# Patient Record
Sex: FEMALE | Race: WHITE | NOT HISPANIC OR LATINO | Employment: UNEMPLOYED | URBAN - METROPOLITAN AREA
[De-identification: names, ages, dates, MRNs, and addresses within clinical notes are randomized per-mention and may not be internally consistent; named-entity substitution may affect disease eponyms.]

---

## 2017-10-25 ENCOUNTER — HOSPITAL ENCOUNTER (OUTPATIENT)
Dept: LAB | Facility: MEDICAL CENTER | Age: 47
End: 2017-10-25
Attending: FAMILY MEDICINE
Payer: MEDICAID

## 2017-10-25 LAB
ALBUMIN SERPL BCP-MCNC: 4.5 G/DL (ref 3.2–4.9)
ALBUMIN/GLOB SERPL: 1.5 G/DL
ALP SERPL-CCNC: 67 U/L (ref 30–99)
ALT SERPL-CCNC: 14 U/L (ref 2–50)
ANION GAP SERPL CALC-SCNC: 5 MMOL/L (ref 0–11.9)
AST SERPL-CCNC: 18 U/L (ref 12–45)
BASOPHILS # BLD AUTO: 0.6 % (ref 0–1.8)
BASOPHILS # BLD: 0.03 K/UL (ref 0–0.12)
BILIRUB SERPL-MCNC: 0.4 MG/DL (ref 0.1–1.5)
BUN SERPL-MCNC: 10 MG/DL (ref 8–22)
CALCIUM SERPL-MCNC: 9.6 MG/DL (ref 8.5–10.5)
CHLORIDE SERPL-SCNC: 105 MMOL/L (ref 96–112)
CHOLEST SERPL-MCNC: 288 MG/DL (ref 100–199)
CO2 SERPL-SCNC: 28 MMOL/L (ref 20–33)
CREAT SERPL-MCNC: 0.71 MG/DL (ref 0.5–1.4)
EOSINOPHIL # BLD AUTO: 0.13 K/UL (ref 0–0.51)
EOSINOPHIL NFR BLD: 2.8 % (ref 0–6.9)
ERYTHROCYTE [DISTWIDTH] IN BLOOD BY AUTOMATED COUNT: 41.2 FL (ref 35.9–50)
EST. AVERAGE GLUCOSE BLD GHB EST-MCNC: 114 MG/DL
GFR SERPL CREATININE-BSD FRML MDRD: >60 ML/MIN/1.73 M 2
GLOBULIN SER CALC-MCNC: 3.1 G/DL (ref 1.9–3.5)
GLUCOSE SERPL-MCNC: 94 MG/DL (ref 65–99)
HBA1C MFR BLD: 5.6 % (ref 0–5.6)
HCT VFR BLD AUTO: 44.7 % (ref 37–47)
HDLC SERPL-MCNC: 68 MG/DL
HGB BLD-MCNC: 15.7 G/DL (ref 12–16)
IMM GRANULOCYTES # BLD AUTO: 0.01 K/UL (ref 0–0.11)
IMM GRANULOCYTES NFR BLD AUTO: 0.2 % (ref 0–0.9)
LDLC SERPL CALC-MCNC: 199 MG/DL
LYMPHOCYTES # BLD AUTO: 1.81 K/UL (ref 1–4.8)
LYMPHOCYTES NFR BLD: 39.1 % (ref 22–41)
MCH RBC QN AUTO: 31.4 PG (ref 27–33)
MCHC RBC AUTO-ENTMCNC: 35.1 G/DL (ref 33.6–35)
MCV RBC AUTO: 89.4 FL (ref 81.4–97.8)
MONOCYTES # BLD AUTO: 0.27 K/UL (ref 0–0.85)
MONOCYTES NFR BLD AUTO: 5.8 % (ref 0–13.4)
NEUTROPHILS # BLD AUTO: 2.38 K/UL (ref 2–7.15)
NEUTROPHILS NFR BLD: 51.5 % (ref 44–72)
NRBC # BLD AUTO: 0 K/UL
NRBC BLD AUTO-RTO: 0 /100 WBC
PLATELET # BLD AUTO: 210 K/UL (ref 164–446)
PMV BLD AUTO: 10.8 FL (ref 9–12.9)
POTASSIUM SERPL-SCNC: 4.4 MMOL/L (ref 3.6–5.5)
PROT SERPL-MCNC: 7.6 G/DL (ref 6–8.2)
RBC # BLD AUTO: 5 M/UL (ref 4.2–5.4)
SODIUM SERPL-SCNC: 138 MMOL/L (ref 135–145)
TRIGL SERPL-MCNC: 107 MG/DL (ref 0–149)
TSH SERPL DL<=0.005 MIU/L-ACNC: 2.8 UIU/ML (ref 0.3–3.7)
WBC # BLD AUTO: 4.6 K/UL (ref 4.8–10.8)

## 2017-10-25 PROCEDURE — 80061 LIPID PANEL: CPT

## 2017-10-25 PROCEDURE — 80053 COMPREHEN METABOLIC PANEL: CPT

## 2017-10-25 PROCEDURE — 36415 COLL VENOUS BLD VENIPUNCTURE: CPT

## 2017-10-25 PROCEDURE — 84443 ASSAY THYROID STIM HORMONE: CPT

## 2017-10-25 PROCEDURE — 85025 COMPLETE CBC W/AUTO DIFF WBC: CPT

## 2017-10-25 PROCEDURE — 83036 HEMOGLOBIN GLYCOSYLATED A1C: CPT

## 2017-12-12 ENCOUNTER — OFFICE VISIT (OUTPATIENT)
Dept: MEDICAL GROUP | Facility: MEDICAL CENTER | Age: 47
End: 2017-12-12
Attending: INTERNAL MEDICINE
Payer: MEDICAID

## 2017-12-12 VITALS
TEMPERATURE: 97.9 F | DIASTOLIC BLOOD PRESSURE: 78 MMHG | HEIGHT: 67 IN | RESPIRATION RATE: 16 BRPM | SYSTOLIC BLOOD PRESSURE: 120 MMHG | WEIGHT: 177 LBS | HEART RATE: 108 BPM | OXYGEN SATURATION: 97 % | BODY MASS INDEX: 27.78 KG/M2

## 2017-12-12 DIAGNOSIS — F31.70 BIPOLAR DISORDER IN FULL REMISSION, MOST RECENT EPISODE UNSPECIFIED TYPE (HCC): ICD-10-CM

## 2017-12-12 DIAGNOSIS — M25.562 CHRONIC PAIN OF LEFT KNEE: ICD-10-CM

## 2017-12-12 DIAGNOSIS — G43.009 MIGRAINE WITHOUT AURA AND WITHOUT STATUS MIGRAINOSUS, NOT INTRACTABLE: ICD-10-CM

## 2017-12-12 DIAGNOSIS — K21.9 GASTROESOPHAGEAL REFLUX DISEASE, ESOPHAGITIS PRESENCE NOT SPECIFIED: ICD-10-CM

## 2017-12-12 DIAGNOSIS — K51.90 ULCERATIVE COLITIS WITHOUT COMPLICATIONS, UNSPECIFIED LOCATION (HCC): ICD-10-CM

## 2017-12-12 DIAGNOSIS — G35 MULTIPLE SCLEROSIS (HCC): ICD-10-CM

## 2017-12-12 DIAGNOSIS — G89.29 CHRONIC PAIN OF LEFT KNEE: ICD-10-CM

## 2017-12-12 PROBLEM — J30.2 SEASONAL ALLERGIES: Status: ACTIVE | Noted: 2017-12-12

## 2017-12-12 PROCEDURE — 99204 OFFICE O/P NEW MOD 45 MIN: CPT | Performed by: INTERNAL MEDICINE

## 2017-12-12 RX ORDER — CLONAZEPAM 1 MG/1
TABLET ORAL
Refills: 3 | COMMUNITY
Start: 2017-11-03

## 2017-12-12 RX ORDER — FEXOFENADINE HCL 180 MG/1
180 TABLET ORAL DAILY
Qty: 30 TAB | COMMUNITY
Start: 2017-12-12

## 2017-12-12 RX ORDER — CHOLECALCIFEROL (VITAMIN D3) 125 MCG
500 CAPSULE ORAL DAILY
Qty: 30 TAB | Refills: 3 | COMMUNITY
Start: 2017-12-12

## 2017-12-12 RX ORDER — DOCUSATE SODIUM 100 MG/1
300 CAPSULE, LIQUID FILLED ORAL
COMMUNITY
Start: 2017-12-12

## 2017-12-12 RX ORDER — SUMATRIPTAN 50 MG/1
50 TABLET, FILM COATED ORAL
Qty: 10 TAB | Refills: 1 | Status: SHIPPED | OUTPATIENT
Start: 2017-12-12 | End: 2018-04-06 | Stop reason: SDUPTHER

## 2017-12-12 RX ORDER — LAMOTRIGINE 25 MG/1
TABLET ORAL
Refills: 3 | COMMUNITY
Start: 2017-11-27 | End: 2018-01-24

## 2017-12-12 RX ORDER — CARBAMAZEPINE 200 MG/1
400 TABLET ORAL
Refills: 3 | COMMUNITY
Start: 2017-11-27

## 2017-12-12 RX ORDER — DIMETHYL FUMARATE 240 MG/1
1 CAPSULE ORAL 2 TIMES DAILY
Qty: 60 CAP | Status: SHIPPED | DISCHARGE
Start: 2017-12-12

## 2017-12-12 RX ORDER — METHYLPREDNISOLONE 4 MG
1 TABLET, DOSE PACK ORAL DAILY
COMMUNITY
Start: 2017-12-12

## 2017-12-12 RX ORDER — SUMATRIPTAN 50 MG/1
TABLET, FILM COATED ORAL
Refills: 0 | COMMUNITY
Start: 2017-10-13 | End: 2017-12-12 | Stop reason: SDUPTHER

## 2017-12-12 RX ORDER — QUETIAPINE FUMARATE 50 MG/1
TABLET, FILM COATED ORAL
Refills: 3 | COMMUNITY
Start: 2017-11-27

## 2017-12-12 RX ORDER — OMEPRAZOLE 20 MG/1
20 TABLET, DELAYED RELEASE ORAL DAILY
Qty: 30 TAB | COMMUNITY
Start: 2017-12-12

## 2017-12-12 ASSESSMENT — PAIN SCALES - GENERAL: PAINLEVEL: NO PAIN

## 2017-12-12 ASSESSMENT — PATIENT HEALTH QUESTIONNAIRE - PHQ9: CLINICAL INTERPRETATION OF PHQ2 SCORE: 0

## 2017-12-12 NOTE — ASSESSMENT & PLAN NOTE
Patient reports being diagnosed with multiple sclerosis several years ago. She currently follows with Dr. Rutherford of neurology. She is on tecfidera 240 mg twice a day.  She is not sure how well her MS is controlled. She doesn't have a good understanding of what constitutes a flare. She states that her current neurologist has told her she is not a MS expert and cannot really help with this. The patient would really like to see someone who is an MS specialist. She complains of muscle spasms in her legs, sharp pain that she feels from her eyes to the back for neck with itching of her ear when it happens, numbness of the top of her scalp. She has tried numerous muscle relaxers including baclofen, Robaxin, Flexeril and states that none of these have been helpful. In the past she smoked marijuana recreationally and did note that it helped a lot with her MS symptoms. She has been thinking about doing this again but has not gotten a medical card or bend down to the dispensaries yet.

## 2017-12-12 NOTE — ASSESSMENT & PLAN NOTE
Patient reports being diagnosed with ulcerative colitis at age 30 in [place]. She states the initial diagnosis was made after she was having a lot of blood in her bowel movements and she had a confirmatory sigmoidoscopy followed by colonoscopy. States she has had at least 10 colonoscopies in her life. She thinks was about 3 years ago when she was living in [place]. She complains of intermittent episodes of hematochezia and abdominal pain usually associated with increased stress. She has not been able to establish care with a gastroenterologist since moving to [place] and feels like she has to much to handle right now with her multiple sclerosis to do this.

## 2017-12-12 NOTE — ASSESSMENT & PLAN NOTE
The patient currently follows with Dr. Fatima of psychiatry. She takes Seroquel, Lamictal, Klonopin, Fetzima, and Tegretol. She feels that her symptoms are well controlled at this point.

## 2017-12-13 PROBLEM — K21.9 GERD (GASTROESOPHAGEAL REFLUX DISEASE): Status: ACTIVE | Noted: 2017-12-13

## 2017-12-13 PROBLEM — G43.009 MIGRAINE WITHOUT AURA AND WITHOUT STATUS MIGRAINOSUS, NOT INTRACTABLE: Status: ACTIVE | Noted: 2017-12-13

## 2017-12-13 NOTE — PROGRESS NOTES
Freda Linares is a 47 y.o. female here for chronic medical problems as discussed below, muscle spasms and chronic pain, establish care  HPI:  Previous PCP through [facility]    Multiple sclerosis (CMS-HCC)  Patient reports being diagnosed with multiple sclerosis several years ago. She currently follows with Dr. Rutherford of neurology. She is on tecfidera 240 mg twice a day.  She is not sure how well her MS is controlled. She doesn't have a good understanding of what constitutes a flare. She states that her current neurologist has told her she is not a MS expert and cannot really help with this. The patient would really like to see someone who is an MS specialist. She complains of muscle spasms in her legs, sharp pain that she feels from her eyes to the back for neck with itching of her ear when it happens, numbness of the top of her scalp. She has tried numerous muscle relaxers including baclofen, Robaxin, Flexeril and states that none of these have been helpful. In the past she smoked marijuana recreationally and did note that it helped a lot with her MS symptoms. She has been thinking about doing this again but has not gotten a medical card or bend down to the dispensaries yet.     Ulcerative colitis (CMS-HCC)  Patient reports being diagnosed with ulcerative colitis at age 30 in [place]. She states the initial diagnosis was made after she was having a lot of blood in her bowel movements and she had a confirmatory sigmoidoscopy followed by colonoscopy. States she has had at least 10 colonoscopies in her life. She thinks was about 3 years ago when she was living in [place]. She complains of intermittent episodes of hematochezia and abdominal pain usually associated with increased stress. She has not been able to establish care with a gastroenterologist since moving to [place] and feels like she has to much to handle right now with her multiple sclerosis to do this.     Bipolar disorder in full remission (CMS-HCC)  The patient  currently follows with Dr. Fatima of psychiatry. She takes Seroquel, Lamictal, Klonopin, Fetzima, and Tegretol. She feels that her symptoms are well controlled at this point.    Chronic pain of left knee  Patient reports daily pain in the posterior aspect of her left knee. She reports a sensation of fullness, pain with flexion and extension of the knee that is severe. She has never had any imaging done of the knee.    GERD (gastroesophageal reflux disease)  Takes daily omeprazole which states controls her symptoms well.    Migraine without aura and without status migrainosus, not intractable  Uses sumatriptan as needed which works well for her. She is requesting a refill today.    Current medicines (including changes today)  Current Outpatient Prescriptions   Medication Sig Dispense Refill   • Levomilnacipran HCl ER (FETZIMA) 40 MG CAPSULE SR 24 HR Take 1 Cap by mouth every day.     • Dimethyl Fumarate (TECFIDERA) 240 MG CAPSULE DELAYED RELEASE Take 240 mg by mouth 2 Times a Day. 60 Cap    • Omega-3 Fatty Acids (RA FISH OIL) 1400 MG CAPSULE DELAYED RELEASE Take 1 Cap by mouth every bedtime.     • Multiple Vitamins-Minerals (BODY/HAIR/SKIN/NAILS) Cap Take 1 Cap by mouth every day.     • Cholecalciferol (VITAMIN D3) 5000 units Cap Take 1 Cap by mouth every day. 30 Cap    • fexofenadine (ALLEGRA) 180 MG tablet Take 1 Tab by mouth every day. 30 Tab    • cyanocobalamin (VITAMIN B-12) 500 MCG Tab Take 1 Tab by mouth every day. 30 Tab 3   • docusate sodium (COLACE) 100 MG Cap Take 3 Caps by mouth every bedtime.     • omeprazole (PRILOSEC OTC) 20 MG tablet Take 1 Tab by mouth every day. 30 Tab    • sumatriptan (IMITREX) 50 MG Tab Take 1 Tab by mouth Once PRN for Migraine for up to 1 dose. 10 Tab 1   • quetiapine (SEROQUEL) 50 MG tablet TK 1 T PO QHS  3   • carbamazepine (TEGRETOL) 200 MG Tab Take 400 mg by mouth every bedtime.  3   • lamotrigine (LAMICTAL) 25 MG Tab TK 2 TS PO QHS  3   • clonazepam (KLONOPIN) 1 MG Tab TK 1 T  "PO BID  3     No current facility-administered medications for this visit.      She  has a past medical history of Bipolar 1 disorder (CMS-[facility]); DJD (degenerative joint disease); and Multiple sclerosis (CMS-[facility]).  She  has a past surgical history that includes cholecystectomy; thyroid lobectomy; primary c section; abdominal hysterectomy total; and carpal tunnel release.  Social History   Substance Use Topics   • Smoking status: Former Smoker     Packs/day: 0.50     Years: 31.00     Types: Cigarettes     Quit date: 9/12/2017   • Smokeless tobacco: Never Used   • Alcohol use No     Social History     Social History Narrative   • No narrative on file     Family History   Problem Relation Age of Onset   • Asthma Mother    • Allergies Mother    • Hypertension Mother    • Hypertension Maternal Grandmother    • Cancer Maternal Grandfather    • Diabetes Neg Hx    • Heart Disease Neg Hx    • Stroke Neg Hx    • Psychiatry Neg Hx    • Alcohol/Drug Neg Hx          ROS  As above in HPI  All other systems reviewed and are negative     Objective:     Blood pressure 120/78, pulse (!) 108, temperature 36.6 °C (97.9 °F), resp. rate 16, height 1.702 m (5' 7\"), weight 80.3 kg (177 lb), SpO2 97 %, not currently breastfeeding. Body mass index is 27.72 kg/m².  Physical Exam:    Constitutional: Alert, no distress.  Skin: Warm, dry, good turgor, no rashes in visible areas.  Eye: Equal, round and reactive, conjunctiva clear, lids normal.  ENMT: Lips without lesions, good dentition, oropharynx clear, TM's clear bilaterally.  Neck: Trachea midline, no masses, no thyromegaly. No cervical or supraclavicular lymphadenopathy.  Respiratory: Unlabored respiratory effort, lungs clear to auscultation, no wheezes, no ronchi.  Cardiovascular: Normal S1, S2, no murmur, no edema.  Abdomen: Soft, non-tender, no masses, no hepatosplenomegaly.  Psych: Alert and oriented x3, normal affect and mood.  MSK: fullness in posterior fossa of left knee with " tenderness to palpaiton  Neuro: no clonus, cogwheeling, rigidity, muscle spasms elicited on exam.  CN II-XII grossly intact      Assessment and Plan:   The following treatment plan was discussed    1. Multiple sclerosis (CMS-HCC)  Unclear degree of control. Patient continues on Tedfidera . She would like to see an MS specialist. With her insurance, she can be seen by renowned neurology and I have sent a message to Dr. Bloch requesting for the patient to see her one of her recommendations.  She has agreed to see the patient.  - Dimethyl Fumarate (TECFIDERA) 240 MG CAPSULE DELAYED RELEASE; Take 240 mg by mouth 2 Times a Day.  Dispense: 60 Cap  - REFERRAL TO NEUROLOGY    2. Bipolar disorder in full remission, most recent episode unspecified type (CMS-HCC)  Stable, well controlled with current medications which patient receives from psychiatry. Continue follow-up with psychiatry.  - quetiapine (SEROQUEL) 50 MG tablet; TK 1 T PO QHS; Refill: 3  - carbamazepine (TEGRETOL) 200 MG Tab; Take 400 mg by mouth every bedtime.; Refill: 3  - lamotrigine (LAMICTAL) 25 MG Tab; TK 2 TS PO QHS; Refill: 3  - clonazepam (KLONOPIN) 1 MG Tab; TK 1 T PO BID; Refill: 3  - Levomilnacipran HCl ER (FETZIMA) 40 MG CAPSULE SR 24 HR; Take 1 Cap by mouth every day.    3. Ulcerative colitis without complications, unspecified location (CMS-HCC)  Unclear degree of control. We do not have any documentation supporting this is a definitive diagnosis but the patient does report a history consistent with it. She does not want to see a GI specialist at this point as she feels like she has to much to handle with her MS currently. We will defer GI referral until her MS has been sorted out a bit better.  -GI referral in the future, postponed for now at patient request    4. Chronic pain of left knee  Suspect Baker's cyst. We will get an ultrasound of the posterior knee to look for this. If negative, we'll proceed with an x-ray to look for any osteoarthritis.  Physical therapy is an option in the future.  - US-EXTREMITY NON VASCULAR BILATERAL; Future    5. Gastroesophageal reflux disease, esophagitis presence not specified  Stable, well controlled with current medications which patient purchases over-the-counter  - omeprazole (PRILOSEC OTC) 20 MG tablet; Take 1 Tab by mouth every day.  Dispense: 30 Tab    6. Migraine without aura and without status migrainosus, not intractable  Stable, controlled with sumatriptan. Refilled today.  - sumatriptan (IMITREX) 50 MG Tab; Take 1 Tab by mouth Once PRN for Migraine for up to 1 dose.  Dispense: 10 Tab; Refill: 1        Followup: Return in about 6 weeks (around 1/23/2018), or if symptoms worsen or fail to improve.

## 2017-12-13 NOTE — ASSESSMENT & PLAN NOTE
Patient reports daily pain in the posterior aspect of her left knee. She reports a sensation of fullness, pain with flexion and extension of the knee that is severe. She has never had any imaging done of the knee.

## 2017-12-22 ENCOUNTER — TELEPHONE (OUTPATIENT)
Dept: MEDICAL GROUP | Facility: MEDICAL CENTER | Age: 47
End: 2017-12-22

## 2017-12-22 NOTE — TELEPHONE ENCOUNTER
Please inform patient and referrals department that I have personally spoken with Dr. Melissa Bloch with neurology who is an MS specialist who has agreed to see the patient in her clinic.    Carol Roque M.D.

## 2017-12-22 NOTE — TELEPHONE ENCOUNTER
Received phone call from referrals dept informing us that all Dr Braxton's office has turned down the referral as well as a secondary office. Both offices suggested that the Pt continue to follow up with Dr Roque or perhaps there may be a Facility in [place] that would be willing to accept the Pt. Referrals Dept advised to resubmit the referral if [place] is an avenue that needs to be followed up with.

## 2018-01-16 ENCOUNTER — HOSPITAL ENCOUNTER (OUTPATIENT)
Dept: RADIOLOGY | Facility: MEDICAL CENTER | Age: 48
End: 2018-01-16
Attending: INTERNAL MEDICINE
Payer: MEDICAID

## 2018-01-16 DIAGNOSIS — G89.29 CHRONIC PAIN OF LEFT KNEE: ICD-10-CM

## 2018-01-16 DIAGNOSIS — M25.562 CHRONIC PAIN OF LEFT KNEE: ICD-10-CM

## 2018-01-16 PROCEDURE — 76881 US COMPL JOINT R-T W/IMG: CPT

## 2018-01-24 ENCOUNTER — OFFICE VISIT (OUTPATIENT)
Dept: MEDICAL GROUP | Facility: MEDICAL CENTER | Age: 48
End: 2018-01-24
Attending: INTERNAL MEDICINE
Payer: MEDICAID

## 2018-01-24 ENCOUNTER — HOSPITAL ENCOUNTER (OUTPATIENT)
Dept: RADIOLOGY | Facility: MEDICAL CENTER | Age: 48
End: 2018-01-24
Attending: INTERNAL MEDICINE
Payer: MEDICAID

## 2018-01-24 VITALS
DIASTOLIC BLOOD PRESSURE: 78 MMHG | SYSTOLIC BLOOD PRESSURE: 112 MMHG | TEMPERATURE: 97.9 F | RESPIRATION RATE: 16 BRPM | WEIGHT: 169 LBS | HEART RATE: 94 BPM | OXYGEN SATURATION: 98 % | HEIGHT: 67 IN | BODY MASS INDEX: 26.53 KG/M2

## 2018-01-24 DIAGNOSIS — Z23 NEED FOR VACCINATION: ICD-10-CM

## 2018-01-24 DIAGNOSIS — J45.20 MILD INTERMITTENT ASTHMA WITHOUT COMPLICATION: ICD-10-CM

## 2018-01-24 DIAGNOSIS — M25.562 CHRONIC PAIN OF LEFT KNEE: ICD-10-CM

## 2018-01-24 DIAGNOSIS — R21 RASH: ICD-10-CM

## 2018-01-24 DIAGNOSIS — K51.90 ULCERATIVE COLITIS WITHOUT COMPLICATIONS, UNSPECIFIED LOCATION (HCC): ICD-10-CM

## 2018-01-24 DIAGNOSIS — G89.29 CHRONIC PAIN OF LEFT KNEE: ICD-10-CM

## 2018-01-24 DIAGNOSIS — K59.09 OTHER CONSTIPATION: ICD-10-CM

## 2018-01-24 PROBLEM — K59.00 CONSTIPATION: Status: ACTIVE | Noted: 2018-01-24

## 2018-01-24 PROCEDURE — 99213 OFFICE O/P EST LOW 20 MIN: CPT | Mod: 25 | Performed by: INTERNAL MEDICINE

## 2018-01-24 PROCEDURE — 73564 X-RAY EXAM KNEE 4 OR MORE: CPT | Mod: LT

## 2018-01-24 PROCEDURE — 99214 OFFICE O/P EST MOD 30 MIN: CPT | Mod: 25 | Performed by: INTERNAL MEDICINE

## 2018-01-24 PROCEDURE — 90471 IMMUNIZATION ADMIN: CPT | Performed by: INTERNAL MEDICINE

## 2018-01-24 PROCEDURE — 90732 PPSV23 VACC 2 YRS+ SUBQ/IM: CPT | Performed by: INTERNAL MEDICINE

## 2018-01-24 RX ORDER — LAMOTRIGINE 100 MG/1
100 TABLET ORAL DAILY
Qty: 30 TAB | Status: SHIPPED | DISCHARGE
Start: 2018-01-24

## 2018-01-24 ASSESSMENT — PAIN SCALES - GENERAL: PAINLEVEL: NO PAIN

## 2018-01-24 NOTE — ASSESSMENT & PLAN NOTE
Patient reports issues with constipation on and off for many years. It seems to be particularly bothersome to her right now. Although she has about a bowel movement every day she states that it is very difficult for her to push it out. She has had hemorrhoids in the past and reports that bowel movements can be painful. She is currently drinking quite a bit of water and trying to stick to 2-3 64 ounces bottles a day. She has also increased her fruit and vegetable intake. She takes 300 mg of Colace at night. In the past she has tried Senokot, suppositories, prune juice, MiraLAX, supplemental fiber without improvement in symptoms. She does report occasional trace blood in her stools but denies this recently.

## 2018-01-24 NOTE — ASSESSMENT & PLAN NOTE
Patient reports that her left knee continues to be painful. We did the ultrasound of the popliteal fossa and she had a very small Baker's cyst but unlikely the cause of her pain. She continues to have pain when walking up or down stairs. She has fallen multiple times and twisted the knee in the past. She feels every so often like the knee will give out. She has never had any imaging done of the knee. She has never done physical therapy for it either.

## 2018-01-25 DIAGNOSIS — G89.29 CHRONIC PAIN OF LEFT KNEE: ICD-10-CM

## 2018-01-25 DIAGNOSIS — M25.562 CHRONIC PAIN OF LEFT KNEE: ICD-10-CM

## 2018-01-25 NOTE — PROGRESS NOTES
Subjective:   Freda Linares is a 48 y.o. female here today for follow-up ultrasound, left knee pain, rash underneath breasts, constipation, Pneumovax    Chronic pain of left knee  Patient reports that her left knee continues to be painful. We did the ultrasound of the popliteal fossa and she had a very small Baker's cyst but unlikely the cause of her pain. She continues to have pain when walking up or down stairs. She has fallen multiple times and twisted the knee in the past. She feels every so often like the knee will give out. She has never had any imaging done of the knee. She has never done physical therapy for it either.     Constipation  Patient reports issues with constipation on and off for many years. It seems to be particularly bothersome to her right now. Although she has about a bowel movement every day she states that it is very difficult for her to push it out. She has had hemorrhoids in the past and reports that bowel movements can be painful. She is currently drinking quite a bit of water and trying to stick to 2-3 64 ounces bottles a day. She has also increased her fruit and vegetable intake. She takes 300 mg of Colace at night. In the past she has tried Senokot, suppositories, prune juice, MiraLAX, supplemental fiber without improvement in symptoms. She does report occasional trace blood in her stools but denies this recently.     Rash  Patient reports several weeks of a red rash below both breasts. Denies itching or pain. Has been putting powder on it and keeping it clean. Has not seen it spread anywhere.    Ulcerative colitis (CMS-[facility])  Reports a history of ulcerative colitis that was diagnosed when she was 30 years old. States initial diagnosis was made after she was having a lot of blood in her bowel movements and she had a confirmatory sigmoidoscopy followed by colonoscopy. She is not currently on any treatment for ulcerative colitis. She states she doesn't currently feel like she is  "having a flare. She does not have a GI doctor established in [place].    Mild intermittent asthma  Patient reports a history of asthma. She is requesting a pneumonia shot today. She also has a history of cigarette smoking but has recently quit.         Current medicines (including changes today)  Current Outpatient Prescriptions   Medication Sig Dispense Refill   • lamotrigine (LAMICTAL) 100 MG Tab Take 1 Tab by mouth every day. 30 Tab    • quetiapine (SEROQUEL) 50 MG tablet TK 1 T PO QHS  3   • carbamazepine (TEGRETOL) 200 MG Tab Take 400 mg by mouth every bedtime.  3   • clonazepam (KLONOPIN) 1 MG Tab TK 1 T PO BID  3   • Levomilnacipran HCl ER (FETZIMA) 40 MG CAPSULE SR 24 HR Take 1 Cap by mouth every day.     • Dimethyl Fumarate (TECFIDERA) 240 MG CAPSULE DELAYED RELEASE Take 240 mg by mouth 2 Times a Day. 60 Cap    • Omega-3 Fatty Acids (RA FISH OIL) 1400 MG CAPSULE DELAYED RELEASE Take 1 Cap by mouth every bedtime.     • Multiple Vitamins-Minerals (BODY/HAIR/SKIN/NAILS) Cap Take 1 Cap by mouth every day.     • Cholecalciferol (VITAMIN D3) 5000 units Cap Take 1 Cap by mouth every day. 30 Cap    • fexofenadine (ALLEGRA) 180 MG tablet Take 1 Tab by mouth every day. 30 Tab    • cyanocobalamin (VITAMIN B-12) 500 MCG Tab Take 1 Tab by mouth every day. 30 Tab 3   • docusate sodium (COLACE) 100 MG Cap Take 3 Caps by mouth every bedtime.     • omeprazole (PRILOSEC OTC) 20 MG tablet Take 1 Tab by mouth every day. 30 Tab    • sumatriptan (IMITREX) 50 MG Tab Take 1 Tab by mouth Once PRN for Migraine for up to 1 dose. 10 Tab 1     No current facility-administered medications for this visit.      She  has a past medical history of Bipolar 1 disorder (CMS-HCC); DJD (degenerative joint disease); and Multiple sclerosis (CMS-HCC).    ROS   As above in HPI     Objective:     Blood pressure 112/78, pulse 94, temperature 36.6 °C (97.9 °F), resp. rate 16, height 1.702 m (5' 7.01\"), weight 76.7 kg (169 lb), SpO2 98 %, not currently " breastfeeding. Body mass index is 26.46 kg/m².   Physical Exam:  Constitutional: Alert, no distress.  Skin: Warm, dry, good turgor, small punctate closed comedonal lesions beneath breasts without evidence of cellulitis.  Eye: Equal, round and reactive, conjunctiva clear, lids normal.  MSK: No evidence of effusion of left knee, tenderness to palpation and posterior fossa, no tenderness to palpation in the peripatellar region or the lateral or medial joint lines  Psych: Alert and oriented x3, normal affect and mood.    Results and Imaging:   Ultrasound knee (1/24/17):  FINDINGS:  A left popliteal fossa cyst measures approximately 8 x 3 x 7 mm.   Impression       Subcentimeter left popliteal cyst         Assessment and Plan:   The following treatment plan was discussed    1. Chronic pain of left knee  No evidence of a large Baker's cyst that could be causing the pain. Her pain is not typical for osteoarthritis but we will obtain an x-ray as initial workup. She may have a torn meniscus or other ligamentous damage from previous falls especially with her complaints of instability. If x-ray is positive for osteoarthritis, we will try a cortisone injection and maybe some physical therapy. If not, would consider MRI.  - DX-KNEE COMPLETE 4+ LEFT; Future    2. Rash  Appears to be clogged pores with closed comedonal lesions. We discussed using an acne wash in the area, keeping it clean and dry, topical antibacterial ointment until it heals.    3. Other constipation  Chronic, has not been responsive to numerous medications, hydration, fiber in the past. Patient has really exhausted almost all options for assistance with constipation. Maybe this can be addressed when she follows up with GI. For now she is having daily bowel movements and she will continue with her current regimen.    4. Ulcerative colitis without complications, unspecified location (CMS-[facility])  Has yet to establish care with a gastroenterologist. I have placed a  referral today. No evidence of acute flare at this point.  - REFERRAL TO GASTROENTEROLOGY    5. Mild intermittent asthma without complication  Stable, controlled without medication for the most part.    6. Need for vaccination  - PneumoVax PPV23 =>1yo    Followup: Return in about 4 weeks (around 2/21/2018), or if symptoms worsen or fail to improve.

## 2018-01-25 NOTE — ASSESSMENT & PLAN NOTE
Patient reports a history of asthma. She is requesting a pneumonia shot today. She also has a history of cigarette smoking but has recently quit.

## 2018-01-25 NOTE — ASSESSMENT & PLAN NOTE
Reports a history of ulcerative colitis that was diagnosed when she was 30 years old. States initial diagnosis was made after she was having a lot of blood in her bowel movements and she had a confirmatory sigmoidoscopy followed by colonoscopy. She is not currently on any treatment for ulcerative colitis. She states she doesn't currently feel like she is having a flare. She does not have a GI doctor established in [place].

## 2018-01-25 NOTE — ASSESSMENT & PLAN NOTE
Patient reports several weeks of a red rash below both breasts. Denies itching or pain. Has been putting powder on it and keeping it clean. Has not seen it spread anywhere.

## 2018-02-13 ENCOUNTER — OFFICE VISIT (OUTPATIENT)
Dept: NEUROLOGY | Facility: MEDICAL CENTER | Age: 48
End: 2018-02-13
Payer: MEDICAID

## 2018-02-13 VITALS
WEIGHT: 165 LBS | OXYGEN SATURATION: 96 % | BODY MASS INDEX: 25.9 KG/M2 | TEMPERATURE: 97.4 F | DIASTOLIC BLOOD PRESSURE: 72 MMHG | SYSTOLIC BLOOD PRESSURE: 116 MMHG | HEART RATE: 75 BPM | HEIGHT: 67 IN

## 2018-02-13 DIAGNOSIS — G43.009 MIGRAINE WITHOUT AURA AND WITHOUT STATUS MIGRAINOSUS, NOT INTRACTABLE: ICD-10-CM

## 2018-02-13 DIAGNOSIS — G35 MULTIPLE SCLEROSIS (HCC): ICD-10-CM

## 2018-02-13 PROCEDURE — 64405 NJX AA&/STRD GR OCPL NRV: CPT | Performed by: PSYCHIATRY & NEUROLOGY

## 2018-02-13 PROCEDURE — 99999 PR NO CHARGE: CPT | Performed by: PSYCHIATRY & NEUROLOGY

## 2018-02-13 PROCEDURE — S0020 INJECTION, BUPIVICAINE HYDRO: HCPCS | Performed by: PSYCHIATRY & NEUROLOGY

## 2018-02-13 PROCEDURE — 99204 OFFICE O/P NEW MOD 45 MIN: CPT | Mod: 25 | Performed by: PSYCHIATRY & NEUROLOGY

## 2018-02-13 RX ORDER — BUPIVACAINE HYDROCHLORIDE 5 MG/ML
10 INJECTION, SOLUTION EPIDURAL; INTRACAUDAL ONCE
Status: COMPLETED | OUTPATIENT
Start: 2018-02-13 | End: 2018-02-13

## 2018-02-13 RX ORDER — TRIAMCINOLONE ACETONIDE 40 MG/ML
40 INJECTION, SUSPENSION INTRA-ARTICULAR; INTRAMUSCULAR ONCE
Status: COMPLETED | OUTPATIENT
Start: 2018-02-13 | End: 2018-02-13

## 2018-02-13 RX ADMIN — TRIAMCINOLONE ACETONIDE 40 MG: 40 INJECTION, SUSPENSION INTRA-ARTICULAR; INTRAMUSCULAR at 15:49

## 2018-02-13 RX ADMIN — BUPIVACAINE HYDROCHLORIDE 10 ML: 5 INJECTION, SOLUTION EPIDURAL; INTRACAUDAL at 15:48

## 2018-02-13 NOTE — ASSESSMENT & PLAN NOTE
Pt has daily bad headaches. Pt states that she has failed topamax and she is currently on lamictal. Pt states that she has issues with sumatriptan that only works sometime. Headache is very bad today on the right side. Patient states headaches have been a prominent part of her life and run in her family. Chronic headaches is more of a problem even in her MS she states.
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
## 2018-02-16 ENCOUNTER — HOSPITAL ENCOUNTER (OUTPATIENT)
Dept: RADIOLOGY | Facility: MEDICAL CENTER | Age: 48
End: 2018-02-16

## 2018-02-20 ENCOUNTER — HOSPITAL ENCOUNTER (OUTPATIENT)
Dept: RADIOLOGY | Facility: MEDICAL CENTER | Age: 48
End: 2018-02-20
Attending: INTERNAL MEDICINE
Payer: MEDICAID

## 2018-02-20 DIAGNOSIS — M25.562 CHRONIC PAIN OF LEFT KNEE: ICD-10-CM

## 2018-02-20 DIAGNOSIS — G89.29 CHRONIC PAIN OF LEFT KNEE: ICD-10-CM

## 2018-02-20 PROCEDURE — 73721 MRI JNT OF LWR EXTRE W/O DYE: CPT | Mod: LT

## 2018-03-01 ENCOUNTER — TELEPHONE (OUTPATIENT)
Dept: MEDICAL GROUP | Facility: MEDICAL CENTER | Age: 48
End: 2018-03-01

## 2018-03-01 NOTE — TELEPHONE ENCOUNTER
----- Message from Carol Roque M.D. sent at 3/1/2018  7:30 AM PST -----  Please inform patient her MRI knee shows that the ligaments and cartilage are all intact and normal.  She has a moderate amount of arthritis, and again the small Baker's cyst which we knew about.  I think the arthritis is the most likely cause of her pain and the instability she feels may be related to weakening of the muscles which stabilize the knee.  I would recommend physical therapy at this point to work on strengthening the knee and increasing stability.  Let me know if this is something she would like to pursue and I can get a referral over to them.    Carol Roque M.D.

## 2018-03-07 ENCOUNTER — TELEPHONE (OUTPATIENT)
Dept: MEDICAL GROUP | Facility: MEDICAL CENTER | Age: 48
End: 2018-03-07

## 2018-03-07 NOTE — TELEPHONE ENCOUNTER
Pt is concerned she states that the report that she is looking at on my chart states there is water on her knee and she states she was not informed of that. She is confused she thought her xray showed no arthritis and now her mri is showing arthritis, per your note you stated on the both mri and xray of noticing arthritis.

## 2018-03-07 NOTE — TELEPHONE ENCOUNTER
"Please inform patient there is a small joint effusion, which just means there is a little extra fluid in the joint.  This is a result of the arthritis and is commonly seen when there is inflammation in the knee related to arthritis.  Her X ray also showed arthritis but X ray is not as detailed as MRI so we were not able to characterize it as well.  Although the x ray said minimal arthritis, we generally go with the MRI as the more reliable source.  The \"moderate patellofemoral cartilage thinning\" is another way of saying there is arthritis between her knee cap and her femur bone which is the upper bone in the knee joint.    Carol Roque M.D.    "

## 2018-03-22 ENCOUNTER — OFFICE VISIT (OUTPATIENT)
Dept: NEUROLOGY | Facility: MEDICAL CENTER | Age: 48
End: 2018-03-22
Payer: MEDICAID

## 2018-03-22 ENCOUNTER — HOSPITAL ENCOUNTER (OUTPATIENT)
Dept: LAB | Facility: MEDICAL CENTER | Age: 48
End: 2018-03-22
Attending: PSYCHIATRY & NEUROLOGY
Payer: MEDICAID

## 2018-03-22 VITALS
HEIGHT: 67 IN | HEART RATE: 81 BPM | SYSTOLIC BLOOD PRESSURE: 116 MMHG | OXYGEN SATURATION: 97 % | WEIGHT: 167.55 LBS | TEMPERATURE: 98.2 F | BODY MASS INDEX: 26.3 KG/M2 | DIASTOLIC BLOOD PRESSURE: 68 MMHG

## 2018-03-22 DIAGNOSIS — G35 MULTIPLE SCLEROSIS (HCC): ICD-10-CM

## 2018-03-22 DIAGNOSIS — G43.009 MIGRAINE WITHOUT AURA AND WITHOUT STATUS MIGRAINOSUS, NOT INTRACTABLE: ICD-10-CM

## 2018-03-22 DIAGNOSIS — G43.119 INTRACTABLE MIGRAINE WITH AURA WITHOUT STATUS MIGRAINOSUS: ICD-10-CM

## 2018-03-22 LAB
25(OH)D3 SERPL-MCNC: 30 NG/ML (ref 30–100)
ALBUMIN SERPL BCP-MCNC: 4.4 G/DL (ref 3.2–4.9)
ALBUMIN/GLOB SERPL: 1.6 G/DL
ALP SERPL-CCNC: 61 U/L (ref 30–99)
ALT SERPL-CCNC: 21 U/L (ref 2–50)
ANION GAP SERPL CALC-SCNC: 6 MMOL/L (ref 0–11.9)
AST SERPL-CCNC: 18 U/L (ref 12–45)
BASOPHILS # BLD AUTO: 0.8 % (ref 0–1.8)
BASOPHILS # BLD: 0.04 K/UL (ref 0–0.12)
BILIRUB SERPL-MCNC: 0.3 MG/DL (ref 0.1–1.5)
BUN SERPL-MCNC: 9 MG/DL (ref 8–22)
CALCIUM SERPL-MCNC: 9.4 MG/DL (ref 8.5–10.5)
CHLORIDE SERPL-SCNC: 105 MMOL/L (ref 96–112)
CO2 SERPL-SCNC: 28 MMOL/L (ref 20–33)
CREAT SERPL-MCNC: 0.67 MG/DL (ref 0.5–1.4)
EOSINOPHIL # BLD AUTO: 0.08 K/UL (ref 0–0.51)
EOSINOPHIL NFR BLD: 1.6 % (ref 0–6.9)
ERYTHROCYTE [DISTWIDTH] IN BLOOD BY AUTOMATED COUNT: 44.5 FL (ref 35.9–50)
GLOBULIN SER CALC-MCNC: 2.8 G/DL (ref 1.9–3.5)
GLUCOSE SERPL-MCNC: 95 MG/DL (ref 65–99)
HCT VFR BLD AUTO: 43.1 % (ref 37–47)
HGB BLD-MCNC: 14.7 G/DL (ref 12–16)
IMM GRANULOCYTES # BLD AUTO: 0.01 K/UL (ref 0–0.11)
IMM GRANULOCYTES NFR BLD AUTO: 0.2 % (ref 0–0.9)
LYMPHOCYTES # BLD AUTO: 1.89 K/UL (ref 1–4.8)
LYMPHOCYTES NFR BLD: 37.2 % (ref 22–41)
MCH RBC QN AUTO: 31.7 PG (ref 27–33)
MCHC RBC AUTO-ENTMCNC: 34.1 G/DL (ref 33.6–35)
MCV RBC AUTO: 92.9 FL (ref 81.4–97.8)
MONOCYTES # BLD AUTO: 0.36 K/UL (ref 0–0.85)
MONOCYTES NFR BLD AUTO: 7.1 % (ref 0–13.4)
NEUTROPHILS # BLD AUTO: 2.7 K/UL (ref 2–7.15)
NEUTROPHILS NFR BLD: 53.1 % (ref 44–72)
NRBC # BLD AUTO: 0 K/UL
NRBC BLD-RTO: 0 /100 WBC
PLATELET # BLD AUTO: 219 K/UL (ref 164–446)
PMV BLD AUTO: 10.6 FL (ref 9–12.9)
POTASSIUM SERPL-SCNC: 4 MMOL/L (ref 3.6–5.5)
PROT SERPL-MCNC: 7.2 G/DL (ref 6–8.2)
RBC # BLD AUTO: 4.64 M/UL (ref 4.2–5.4)
SODIUM SERPL-SCNC: 139 MMOL/L (ref 135–145)
WBC # BLD AUTO: 5.1 K/UL (ref 4.8–10.8)

## 2018-03-22 PROCEDURE — 82306 VITAMIN D 25 HYDROXY: CPT

## 2018-03-22 PROCEDURE — 99213 OFFICE O/P EST LOW 20 MIN: CPT | Mod: 25 | Performed by: PSYCHIATRY & NEUROLOGY

## 2018-03-22 PROCEDURE — 80053 COMPREHEN METABOLIC PANEL: CPT

## 2018-03-22 PROCEDURE — 36415 COLL VENOUS BLD VENIPUNCTURE: CPT

## 2018-03-22 PROCEDURE — 85025 COMPLETE CBC W/AUTO DIFF WBC: CPT

## 2018-03-22 PROCEDURE — 64615 CHEMODENERV MUSC MIGRAINE: CPT | Performed by: PSYCHIATRY & NEUROLOGY

## 2018-03-22 RX ORDER — TIZANIDINE 4 MG/1
4 TABLET ORAL EVERY 6 HOURS PRN
Qty: 30 TAB | Refills: 3 | Status: SHIPPED | OUTPATIENT
Start: 2018-03-22

## 2018-03-22 NOTE — ASSESSMENT & PLAN NOTE
Pt hear for Botox and she is having pain in her left leg. This pain is from the knee down and she has some arthritis in her knee. Pt states that she feels cramps at night.
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
## 2018-04-06 DIAGNOSIS — G43.009 MIGRAINE WITHOUT AURA AND WITHOUT STATUS MIGRAINOSUS, NOT INTRACTABLE: ICD-10-CM

## 2018-04-06 RX ORDER — SUMATRIPTAN 50 MG/1
50 TABLET, FILM COATED ORAL
Qty: 9 TAB | Refills: 3 | Status: SHIPPED | OUTPATIENT
Start: 2018-04-06

## 2018-04-16 ENCOUNTER — TELEPHONE (OUTPATIENT)
Dept: MEDICAL GROUP | Facility: MEDICAL CENTER | Age: 48
End: 2018-04-16

## 2018-04-16 DIAGNOSIS — M25.562 CHRONIC PAIN OF LEFT KNEE: ICD-10-CM

## 2018-04-16 DIAGNOSIS — M17.12 PRIMARY OSTEOARTHRITIS OF LEFT KNEE: ICD-10-CM

## 2018-04-16 DIAGNOSIS — G89.29 CHRONIC PAIN OF LEFT KNEE: ICD-10-CM

## 2018-04-16 NOTE — TELEPHONE ENCOUNTER
1. Caller Name: Freda                                         Call Back Number: [phone] (home)         Patient approves a detailed voicemail message: yes    2. SPECIFIC Action To Be Taken: Please place referral to Physical Therapy    3. Diagnosis/Clinical Reason for Request: Chronic left knee pain    4. Specialty & Provider Name/Lab/Imaging Location: Physical therapy    5. Is appointment scheduled for requested order/referral: no    Patient informed they will receive a return phone call from the office ONLY if there are any questions before processing their request. Advised to call back if they haven't received a call from the referral department in 5 days.

## 2018-07-31 ENCOUNTER — TELEPHONE (OUTPATIENT)
Dept: NEUROLOGY | Facility: MEDICAL CENTER | Age: 48
End: 2018-07-31

## 2018-07-31 NOTE — TELEPHONE ENCOUNTER
Pt called requesting to have CDs she brought to dr Bloch.   MRI Head done at [facility] and MRI Brain done at [facility] diagnostic imaging.  Pt wants them mailed to [address] [place] [address]. [facility] [address]    Mailed cds

## 2023-08-24 ENCOUNTER — PATIENT MESSAGE (OUTPATIENT)
Dept: HEALTH INFORMATION MANAGEMENT | Facility: OTHER | Age: 53
End: 2023-08-24